# Patient Record
Sex: MALE | Race: WHITE | URBAN - METROPOLITAN AREA
[De-identification: names, ages, dates, MRNs, and addresses within clinical notes are randomized per-mention and may not be internally consistent; named-entity substitution may affect disease eponyms.]

---

## 2021-03-18 ENCOUNTER — NEW PATIENT (OUTPATIENT)
Dept: URBAN - METROPOLITAN AREA CLINIC 51 | Facility: CLINIC | Age: 76
End: 2021-03-18
Payer: MEDICARE

## 2021-03-18 DIAGNOSIS — H25.813 COMBINED FORMS OF AGE-RELATED CATARACT, BILATERAL: Primary | ICD-10-CM

## 2021-03-18 DIAGNOSIS — H04.123 TEAR FILM INSUFFICIENCY OF BILATERAL LACRIMAL GLANDS: ICD-10-CM

## 2021-03-18 DIAGNOSIS — H02.131 SENILE ECTROPION OF RIGHT UPPER EYELID: ICD-10-CM

## 2021-03-18 PROCEDURE — 92004 COMPRE OPH EXAM NEW PT 1/>: CPT | Performed by: OPTOMETRIST

## 2021-03-18 PROCEDURE — 92134 CPTRZ OPH DX IMG PST SGM RTA: CPT | Performed by: OPTOMETRIST

## 2021-03-18 ASSESSMENT — VISUAL ACUITY
OD: 20/25
OS: 20/25

## 2021-03-18 ASSESSMENT — INTRAOCULAR PRESSURE
OS: 16
OD: 15

## 2021-03-18 ASSESSMENT — KERATOMETRY
OD: 42.28
OS: 42.84

## 2021-09-21 ENCOUNTER — OFFICE VISIT (OUTPATIENT)
Dept: URBAN - METROPOLITAN AREA CLINIC 51 | Facility: CLINIC | Age: 76
End: 2021-09-21
Payer: MEDICARE

## 2021-09-21 DIAGNOSIS — H02.112 CICATRICIAL ECTROPION OF RIGHT LOWER LID: ICD-10-CM

## 2021-09-21 DIAGNOSIS — H35.371 PUCKERING OF MACULA, RIGHT EYE: ICD-10-CM

## 2021-09-21 DIAGNOSIS — H52.4 PRESBYOPIA: ICD-10-CM

## 2021-09-21 PROCEDURE — 92134 CPTRZ OPH DX IMG PST SGM RTA: CPT | Performed by: OPTOMETRIST

## 2021-09-21 PROCEDURE — 92014 COMPRE OPH EXAM EST PT 1/>: CPT | Performed by: OPTOMETRIST

## 2021-09-21 PROCEDURE — 92015 DETERMINE REFRACTIVE STATE: CPT | Performed by: OPTOMETRIST

## 2021-09-21 ASSESSMENT — VISUAL ACUITY
OD: 20/50
OS: 20/30

## 2021-09-21 ASSESSMENT — INTRAOCULAR PRESSURE
OD: 16
OS: 16

## 2021-09-21 NOTE — IMPRESSION/PLAN
Impression: Presbyopia Plan: Patient having difficulty with glasses from 6 months ago. Discussed updating SRx will not improve BCVA due to cataracts, patient understands and wishes to update SRx. Release new SRx at no charge (doctor re-make).

## 2021-09-21 NOTE — IMPRESSION/PLAN
Impression: Vitreous membranes and strands, bilateral: H43.313. *PVD OU Plan: Retina is attached 360 degrees with no signs of any retinal holes, tears, or detachments observed on today's dilated examination. Pt to RTC ASAP if increase in floaters, flashes, or curtain or veil taking away vision.

## 2021-09-21 NOTE — IMPRESSION/PLAN
Impression: Puckering of macula, right eye Plan: Educated patient on findings. ERM w/ retinal thickening, no CME. MAC OCT taken today. No treatment is currently necessary. Patient will monitor vision closely and contact our office with any changes/worsening/distortion of vision. Monitor with MAC OCT.

## 2021-09-21 NOTE — IMPRESSION/PLAN
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 Discussed likely cicatricial +/- senile etiology of condition. Do not recommend treatment at this time. Recommend AT's for comfort.

## 2022-02-24 ENCOUNTER — OFFICE VISIT (OUTPATIENT)
Dept: URBAN - METROPOLITAN AREA CLINIC 51 | Facility: CLINIC | Age: 77
End: 2022-02-24
Payer: MEDICARE

## 2022-02-24 DIAGNOSIS — H35.362 DRUSEN (DEGENERATIVE) OF MACULA, LEFT EYE: ICD-10-CM

## 2022-02-24 DIAGNOSIS — H43.313 VITREOUS MEMBRANES AND STRANDS, BILATERAL: ICD-10-CM

## 2022-02-24 DIAGNOSIS — H35.373 PUCKERING OF MACULA, BILATERAL: ICD-10-CM

## 2022-02-24 PROCEDURE — 99214 OFFICE O/P EST MOD 30 MIN: CPT | Performed by: OPTOMETRIST

## 2022-02-24 PROCEDURE — 92134 CPTRZ OPH DX IMG PST SGM RTA: CPT | Performed by: OPTOMETRIST

## 2022-02-24 ASSESSMENT — KERATOMETRY
OS: 42.83
OD: 42.25

## 2022-02-24 ASSESSMENT — INTRAOCULAR PRESSURE
OS: 16
OD: 16

## 2022-02-24 ASSESSMENT — VISUAL ACUITY
OD: 20/30
OS: 20/20

## 2022-02-24 NOTE — IMPRESSION/PLAN
Impression: Vitreous membranes and strands, bilateral: H43.313. *PVD OU Plan: Retina is attached 360 degrees. Patient to RTC ASAP if increase in floaters, flashes, or curtain or veil taking away vision.

## 2022-02-24 NOTE — IMPRESSION/PLAN
Impression: Combined forms of age-related cataract, bilateral Plan: Discussed cataracts with patient. Discussed treatment options. Surgical treatment is recommended. Surgical risks and benefits discussed. Patient elects surgical treatment. Recommend surgery OU, OD first. 
Patient is at risk for inflammation OU (ERM). Recommend Dexycu + drops. Patient is candidate/interested in toric, standard, LenSx and ORA. Aim OD: [place]   Aim OS: [place] Patient understands that he will need glasses for reading and all near/computer work following surgery, and may need full time glasses for best possible vision after cataract surgery.

## 2022-02-24 NOTE — IMPRESSION/PLAN
Impression: Puckering of macula, bilateral: H35.373. Plan: ERM formation w/no CME OU. MOCT taken. May limit vision after cataract surgery. Monitor with MOCT.

## 2022-02-24 NOTE — IMPRESSION/PLAN
Impression: Tear film insufficiency of bilateral lacrimal glands Plan: Recommend OTC AT's at least 1-2 times a day or more OU.

## 2022-02-24 NOTE — IMPRESSION/PLAN
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 Discussed likely cicatricial +/- senile etiology of condition. Do not recommend treatment at this time. Recommend AT's at least 1-2 times per day or more for comfort.

## 2022-02-24 NOTE — IMPRESSION/PLAN
Impression: Drusen (degenerative) of macula, left eye: H35.362. Plan: Inferior small drusen/RPE changes, no SRF. MOCT taken. May limit vision after cataract surgery. Monitor with MOCT.

## 2022-04-21 ENCOUNTER — ADULT PHYSICAL (OUTPATIENT)
Dept: URBAN - METROPOLITAN AREA CLINIC 51 | Facility: CLINIC | Age: 77
End: 2022-04-21
Payer: MEDICARE

## 2022-04-21 DIAGNOSIS — Z01.818 ENCOUNTER FOR OTHER PREPROCEDURAL EXAMINATION: Primary | ICD-10-CM

## 2022-04-21 DIAGNOSIS — H25.813 COMBINED FORMS OF AGE-RELATED CATARACT, BILATERAL: Primary | ICD-10-CM

## 2022-04-21 PROCEDURE — 99203 OFFICE O/P NEW LOW 30 MIN: CPT | Performed by: PHYSICIAN ASSISTANT

## 2022-05-05 ENCOUNTER — OFFICE VISIT (OUTPATIENT)
Dept: URBAN - METROPOLITAN AREA CLINIC 24 | Facility: CLINIC | Age: 77
End: 2022-05-05
Payer: MEDICARE

## 2022-05-05 DIAGNOSIS — C44.1222: ICD-10-CM

## 2022-05-05 DIAGNOSIS — H43.313 VITREOUS MEMBRANES AND STRANDS, BILATERAL: ICD-10-CM

## 2022-05-05 DIAGNOSIS — H52.203 BILATERAL ASTIGMATISM: ICD-10-CM

## 2022-05-05 DIAGNOSIS — H35.373 PUCKERING OF MACULA, BILATERAL: ICD-10-CM

## 2022-05-05 DIAGNOSIS — H04.123 TEAR FILM INSUFFICIENCY OF BILATERAL LACRIMAL GLANDS: ICD-10-CM

## 2022-05-05 DIAGNOSIS — H35.362 DRUSEN (DEGENERATIVE) OF MACULA, LEFT EYE: ICD-10-CM

## 2022-05-05 PROCEDURE — 99204 OFFICE O/P NEW MOD 45 MIN: CPT | Performed by: OPHTHALMOLOGY

## 2022-05-05 RX ORDER — DICLOFENAC SODIUM 1 MG/ML
0.1 % SOLUTION/ DROPS OPHTHALMIC
Qty: 5 | Refills: 2 | Status: ACTIVE
Start: 2022-05-05

## 2022-05-05 RX ORDER — PREDNISOLONE ACETATE 10 MG/ML
1 % SUSPENSION/ DROPS OPHTHALMIC
Qty: 5 | Refills: 2 | Status: ACTIVE
Start: 2022-05-05

## 2022-05-05 ASSESSMENT — INTRAOCULAR PRESSURE
OS: 16
OD: 16

## 2022-05-05 ASSESSMENT — VISUAL ACUITY
OD: 20/40
OS: 20/20

## 2022-05-05 NOTE — IMPRESSION/PLAN
Impression: Tear film insufficiency of bilateral lacrimal glands Plan: use ATs. Discussed with patient, understands this may limit vision after surgery.

## 2022-05-05 NOTE — IMPRESSION/PLAN
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 Plan: s/p sx. per pt lower lid affected post sx. Discussed with patient, understands this may limit vision after surgery.

## 2022-05-05 NOTE — IMPRESSION/PLAN
Impression: Puckering of macula, bilateral: H35.373. Plan: monitor. Discussed with patient, understands this may limit vision after surgery.

## 2022-05-05 NOTE — IMPRESSION/PLAN
Impression: Drusen (degenerative) of macula, left eye: H35.362. Plan: AREDS and a diet heavier in green leafy vegetables discussed. Signs and symptoms of WET macular degeneration were discussed (wavy vision, blurred vision). Patient instructed to call or return to clinic if condition gets worse. Discussed with patient, understands this may limit vision after surgery.

## 2022-05-12 ENCOUNTER — SURGERY (OUTPATIENT)
Dept: URBAN - METROPOLITAN AREA SURGERY 19 | Facility: SURGERY | Age: 77
End: 2022-05-12
Payer: COMMERCIAL

## 2022-05-12 DIAGNOSIS — H25.813 COMBINED FORMS OF AGE-RELATED CATARACT, BILATERAL: Primary | ICD-10-CM

## 2022-05-12 PROCEDURE — 66982 XCAPSL CTRC RMVL CPLX WO ECP: CPT | Performed by: OPHTHALMOLOGY

## 2022-05-12 RX ORDER — OFLOXACIN 3 MG/ML
0.3 % SOLUTION/ DROPS OPHTHALMIC
Qty: 5 | Refills: 1 | Status: ACTIVE
Start: 2022-05-12

## 2022-05-13 ENCOUNTER — POST-OPERATIVE VISIT (OUTPATIENT)
Dept: URBAN - METROPOLITAN AREA CLINIC 51 | Facility: CLINIC | Age: 77
End: 2022-05-13
Payer: COMMERCIAL

## 2022-05-13 DIAGNOSIS — Z48.810 ENCOUNTER FOR SURGICAL AFTERCARE FOLLOWING SURGERY ON A SENSE ORGAN: Primary | ICD-10-CM

## 2022-05-13 PROCEDURE — 99024 POSTOP FOLLOW-UP VISIT: CPT | Performed by: OPTOMETRIST

## 2022-05-13 ASSESSMENT — INTRAOCULAR PRESSURE
OD: 30
OD: 42

## 2022-05-13 NOTE — IMPRESSION/PLAN
Impression: S/P Cataract Extraction by phacoemulsification with IOL placement; LRI (Limbal Relaxing Incision) OD - 1 Day. Encounter for surgical aftercare following surgery on a sense organ  Z48.810. Plan: Doing well POD1. Swelling and inflammation noted. IOP elevated today (42). Tapped eye 3 times today in office with sterile spud and reduced IOP to 30. Will need to start gtts to lower IOP. Restrictions apply for 1 week. Discussed with patient that vision will continue to improve as swelling and inflammation resolves. Recommend ATs when eyes feel dry/gritty, itchy, or water. Continue all post op drops as instructed (Pred, Diclo and Ofloxacin). START Brimonidine TID OD (sample dispensed). RTC as scheduled for 1 week post op appointment.

## 2022-05-19 ENCOUNTER — ADULT PHYSICAL (OUTPATIENT)
Dept: URBAN - METROPOLITAN AREA CLINIC 51 | Facility: CLINIC | Age: 77
End: 2022-05-19
Payer: COMMERCIAL

## 2022-05-19 DIAGNOSIS — H52.4 PRESBYOPIA: Primary | ICD-10-CM

## 2022-05-19 DIAGNOSIS — Z01.818 ENCOUNTER FOR OTHER PREPROCEDURAL EXAMINATION: Primary | ICD-10-CM

## 2022-05-19 PROCEDURE — 99024 POSTOP FOLLOW-UP VISIT: CPT | Performed by: OPTOMETRIST

## 2022-05-19 PROCEDURE — 99213 OFFICE O/P EST LOW 20 MIN: CPT | Performed by: PHYSICIAN ASSISTANT

## 2022-05-19 ASSESSMENT — INTRAOCULAR PRESSURE
OD: 13
OS: 13
OS: 13
OD: 13

## 2022-05-19 ASSESSMENT — VISUAL ACUITY
OS: 20/20
OD: 20/25

## 2022-05-19 NOTE — IMPRESSION/PLAN
Impression: S/P Cataract Extraction by phacoemulsification with IOL placement; LRI (Limbal Relaxing Incision) OD - 7 Days. Encounter for surgical aftercare following surgery on a sense organ  Z48.810. Plan: Doing well POW1. Swelling and inflammation has resolved since last appointment. D/c Brimonidine. Continue pred/diclo one more week. Okay to proceed with 2nd eye. Recommend AT's for comfort.

## 2022-05-26 ENCOUNTER — SURGERY (OUTPATIENT)
Dept: URBAN - METROPOLITAN AREA SURGERY 19 | Facility: SURGERY | Age: 77
End: 2022-05-26
Payer: COMMERCIAL

## 2022-05-26 DIAGNOSIS — H25.812 COMBINED FORMS OF AGE-RELATED CATARACT, LEFT EYE: Primary | ICD-10-CM

## 2022-05-26 PROCEDURE — 66982 XCAPSL CTRC RMVL CPLX WO ECP: CPT | Performed by: OPHTHALMOLOGY

## 2022-05-27 ENCOUNTER — POST-OPERATIVE VISIT (OUTPATIENT)
Dept: URBAN - METROPOLITAN AREA CLINIC 51 | Facility: CLINIC | Age: 77
End: 2022-05-27
Payer: COMMERCIAL

## 2022-05-27 DIAGNOSIS — Z96.1 PRESENCE OF INTRAOCULAR LENS: Primary | ICD-10-CM

## 2022-05-27 PROCEDURE — 99024 POSTOP FOLLOW-UP VISIT: CPT | Performed by: OPTOMETRIST

## 2022-05-27 ASSESSMENT — INTRAOCULAR PRESSURE: OS: 23

## 2022-05-27 NOTE — IMPRESSION/PLAN
Impression: S/P Cataract Extraction by phacoemulsification with IOL placement; LRI (Limbal Relaxing Incision) OS - 1 Day. Presence of intraocular lens  Z96.1. Plan: Doing well POD1. Swelling and inflammation noted. Restrictions apply for 1 week. Discussed with patient that vision will continue to improve as swelling and inflammation resolves. Recommend ATs when eyes feel dry/gritty, itchy, or water. Continue post op drops as instructed. RTC as scheduled for final post op appointment/SRx.

## 2022-06-23 ENCOUNTER — POST-OPERATIVE VISIT (OUTPATIENT)
Dept: URBAN - METROPOLITAN AREA CLINIC 51 | Facility: CLINIC | Age: 77
End: 2022-06-23
Payer: COMMERCIAL

## 2022-06-23 DIAGNOSIS — Z96.1 PRESENCE OF INTRAOCULAR LENS: ICD-10-CM

## 2022-06-23 DIAGNOSIS — H52.4 PRESBYOPIA: Primary | ICD-10-CM

## 2022-06-23 PROCEDURE — 99024 POSTOP FOLLOW-UP VISIT: CPT | Performed by: OPTOMETRIST

## 2022-06-23 ASSESSMENT — INTRAOCULAR PRESSURE
OS: 12
OD: 14

## 2022-06-23 ASSESSMENT — VISUAL ACUITY
OD: 20/20
OS: 20/20

## 2022-06-23 NOTE — IMPRESSION/PLAN
Impression: S/P Cataract Extraction by phacoemulsification with IOL placement; LRI (Limbal Relaxing Incision) OS - 28 Days. Presence of intraocular lens  Z96.1. Plan: Healed well. Swelling and inflammation has resolved. Clear and centered IOL's OU. Offered the option of releasing SRx vs. OTC readers. Patient declined today's prescription and wishes to trial OTC readers. RTC in 6 months for CE + MAC OCT + FUNDUS PHOTOS.

## 2023-01-19 NOTE — IMPRESSION/PLAN
Call from [facility] on [address] in regards to a rx dated back from Oct 2022. Srinath states rx has 2 sets of directions for the [facility]     CB# [facility] [phone]   Impression: Combined forms of age-related cataract, bilateral Plan: Discussed condition and findings with patient. Cataracts are moderate and visually significant/ effecting ADLs and vision. Educated patient that changing glasses will not completely improve vision. Discussed risk / benefit of surgery with patient. Pt defers sx at this time. Will plan to monitor annually, sooner if patient notices changes/ worsening in vision.

## 2023-03-02 ENCOUNTER — OFFICE VISIT (OUTPATIENT)
Dept: URBAN - METROPOLITAN AREA CLINIC 51 | Facility: CLINIC | Age: 78
End: 2023-03-02
Payer: COMMERCIAL

## 2023-03-02 DIAGNOSIS — H43.313 VITREOUS MEMBRANES AND STRANDS, BILATERAL: ICD-10-CM

## 2023-03-02 DIAGNOSIS — H35.373 PUCKERING OF MACULA, BILATERAL: ICD-10-CM

## 2023-03-02 DIAGNOSIS — Z96.1 PRESENCE OF INTRAOCULAR LENS: ICD-10-CM

## 2023-03-02 DIAGNOSIS — H35.3121 NEXDTVE AGE-RELATED MCLR DEGN, LEFT EYE, EARLY DRY STAGE: Primary | ICD-10-CM

## 2023-03-02 DIAGNOSIS — H04.209 EPIPHORA: ICD-10-CM

## 2023-03-02 PROCEDURE — 92014 COMPRE OPH EXAM EST PT 1/>: CPT | Performed by: OPTOMETRIST

## 2023-03-02 PROCEDURE — 92134 CPTRZ OPH DX IMG PST SGM RTA: CPT | Performed by: OPTOMETRIST

## 2023-03-02 ASSESSMENT — KERATOMETRY
OD: 42.88
OS: 43.25

## 2023-03-02 ASSESSMENT — VISUAL ACUITY
OD: 20/30
OS: 20/25

## 2023-03-02 ASSESSMENT — INTRAOCULAR PRESSURE
OS: 13
OD: 15

## 2023-03-02 NOTE — IMPRESSION/PLAN
Impression: Nexdtve age-related mclr degn, left eye, early dry stage: H35.3121. *Takes daily multivitamin Plan: Drusen/ RPE changes OS only, no SRF OU. MAC OCT taken today. Recommend START AREDS vs. Lutein and Zeaxanthin 10-20mg - handout and PRN pamphlet dispensed to patient. Recommend UV blocking sunglasses when outdoors, avoid smoking, and incorporating green leafy vegetables into diet. RTC with any changes/worsening in vision. Monitor with MAC OCT.

## 2023-03-02 NOTE — IMPRESSION/PLAN
Impression: Puckering of macula, bilateral: H35.373. Plan: Bilateral ERM formation, no CME. MAC OCT taken today. Patient to contact our office if notices changes, worsening or distortion in vision. Monitor with MAC OCT.

## 2023-03-02 NOTE — IMPRESSION/PLAN
Impression: Epiphora: H04.209. Plan: Discussed dryness and poor lid positioning can add to tearing. Recommend AT's at least 2-3 times per day or more OU. Can trial using a gel or ointment in the evening before going to bed. Dry eye handout sheet dispensed to patient.

## 2024-08-13 ENCOUNTER — OFFICE VISIT (OUTPATIENT)
Dept: URBAN - METROPOLITAN AREA CLINIC 51 | Facility: CLINIC | Age: 79
End: 2024-08-13
Payer: MEDICARE

## 2024-08-13 DIAGNOSIS — H35.3131 NONEXUDATIVE MACULAR DEGENERATION, EARLY DRY STAGE, BILATERAL: ICD-10-CM

## 2024-08-13 DIAGNOSIS — H43.313 VITREOUS MEMBRANES AND STRANDS, BILATERAL: ICD-10-CM

## 2024-08-13 DIAGNOSIS — H52.4 PRESBYOPIA: ICD-10-CM

## 2024-08-13 DIAGNOSIS — H35.373 PUCKERING OF MACULA, BILATERAL: ICD-10-CM

## 2024-08-13 DIAGNOSIS — H04.209 EPIPHORA: ICD-10-CM

## 2024-08-13 DIAGNOSIS — H26.493 OTHER SECONDARY CATARACT, BILATERAL: ICD-10-CM

## 2024-08-13 DIAGNOSIS — H04.123 TEAR FILM INSUFFICIENCY OF BILATERAL LACRIMAL GLANDS: Primary | ICD-10-CM

## 2024-08-13 PROCEDURE — 92134 CPTRZ OPH DX IMG PST SGM RTA: CPT | Performed by: OPTOMETRIST

## 2024-08-13 PROCEDURE — 99214 OFFICE O/P EST MOD 30 MIN: CPT | Performed by: OPTOMETRIST

## 2024-08-13 ASSESSMENT — INTRAOCULAR PRESSURE
OD: 17
OS: 17

## 2024-08-13 ASSESSMENT — KERATOMETRY
OS: 42.88
OD: 42.50

## 2024-08-13 ASSESSMENT — VISUAL ACUITY
OD: 20/25
OS: 20/30

## 2024-09-03 ENCOUNTER — OFFICE VISIT (OUTPATIENT)
Dept: URBAN - METROPOLITAN AREA CLINIC 51 | Facility: CLINIC | Age: 79
End: 2024-09-03
Payer: MEDICARE

## 2024-09-03 DIAGNOSIS — H04.209 EPIPHORA: Primary | ICD-10-CM

## 2024-09-03 DIAGNOSIS — H52.4 PRESBYOPIA: ICD-10-CM

## 2024-09-03 PROCEDURE — 99213 OFFICE O/P EST LOW 20 MIN: CPT | Performed by: OPTOMETRIST

## 2024-09-03 ASSESSMENT — VISUAL ACUITY
OD: 20/30
OS: 20/30

## 2024-09-03 ASSESSMENT — KERATOMETRY
OD: 43.00
OS: 42.88

## 2024-09-03 ASSESSMENT — INTRAOCULAR PRESSURE
OS: 15
OD: 14